# Patient Record
Sex: FEMALE | Race: WHITE | Employment: UNEMPLOYED | URBAN - METROPOLITAN AREA
[De-identification: names, ages, dates, MRNs, and addresses within clinical notes are randomized per-mention and may not be internally consistent; named-entity substitution may affect disease eponyms.]

---

## 2017-02-22 PROBLEM — Z91.89 HISTORY OF DRUG OVERDOSE: Status: ACTIVE | Noted: 2017-02-22

## 2017-02-24 PROBLEM — R76.8 HEPATITIS C ANTIBODY TEST POSITIVE: Status: ACTIVE | Noted: 2017-02-24

## 2017-03-02 PROBLEM — G47.00 INSOMNIA: Status: ACTIVE | Noted: 2017-03-02

## 2017-03-02 PROBLEM — K59.00 CONSTIPATION: Status: ACTIVE | Noted: 2017-03-02

## 2017-03-02 PROBLEM — B37.2 MONILIAL RASH: Status: ACTIVE | Noted: 2017-03-02

## 2017-03-02 PROBLEM — Z91.89 HISTORY OF DRUG OVERDOSE: Status: RESOLVED | Noted: 2017-02-22 | Resolved: 2017-03-02

## 2017-03-09 PROBLEM — F20.9 SCHIZOPHRENIA (HCC): Status: ACTIVE | Noted: 2017-03-09

## 2017-05-17 ENCOUNTER — HOSPITAL ENCOUNTER (OUTPATIENT)
Dept: GENERAL RADIOLOGY | Age: 62
Discharge: HOME | End: 2017-05-17
Payer: COMMERCIAL

## 2017-05-17 DIAGNOSIS — M25.551 RIGHT HIP PAIN: ICD-10-CM

## 2017-05-17 DIAGNOSIS — M25.552 LEFT HIP PAIN: ICD-10-CM

## 2017-05-17 DIAGNOSIS — M25.562 LEFT KNEE PAIN: ICD-10-CM

## 2017-05-17 DIAGNOSIS — M25.561 RIGHT KNEE PAIN: ICD-10-CM

## 2017-05-17 DIAGNOSIS — M54.50 LUMBAR BACK PAIN: ICD-10-CM

## 2017-05-17 PROCEDURE — 73560 X-RAY EXAM OF KNEE 1 OR 2: CPT

## 2017-05-17 PROCEDURE — 73502 X-RAY EXAM HIP UNI 2-3 VIEWS: CPT

## 2017-05-17 PROCEDURE — 72110 X-RAY EXAM L-2 SPINE 4/>VWS: CPT

## 2017-07-13 PROBLEM — F20.9 SCHIZOPHRENIA (HCC): Status: RESOLVED | Noted: 2017-03-09 | Resolved: 2017-07-13

## 2017-07-13 PROBLEM — M79.7 FIBROMYALGIA: Status: ACTIVE | Noted: 2017-07-13

## 2017-07-13 PROBLEM — B37.2 MONILIAL RASH: Status: RESOLVED | Noted: 2017-03-02 | Resolved: 2017-07-13

## 2017-11-17 ENCOUNTER — HOSPITAL ENCOUNTER (OUTPATIENT)
Dept: GENERAL RADIOLOGY | Age: 62
Discharge: HOME | End: 2017-11-17
Payer: COMMERCIAL

## 2017-11-17 DIAGNOSIS — M25.541 PAIN IN JOINTS OF RIGHT HAND: ICD-10-CM

## 2017-11-17 DIAGNOSIS — M25.542 PAIN IN JOINTS OF LEFT HAND: ICD-10-CM

## 2017-11-17 PROCEDURE — 73130 X-RAY EXAM OF HAND: CPT

## 2017-11-20 PROBLEM — E11.65 TYPE 2 DIABETES MELLITUS WITH HYPERGLYCEMIA (HCC): Status: ACTIVE | Noted: 2017-11-20

## 2017-11-20 PROBLEM — R07.9 CHEST PAIN: Status: ACTIVE | Noted: 2017-11-20

## 2018-02-22 PROBLEM — M13.0 POLYARTHRITIS: Status: ACTIVE | Noted: 2018-02-22

## 2018-02-22 PROBLEM — R35.0 URINARY FREQUENCY: Status: ACTIVE | Noted: 2018-02-22

## 2018-02-22 PROBLEM — Z78.9 STATIN INTOLERANCE: Status: ACTIVE | Noted: 2018-02-22

## 2018-04-19 ENCOUNTER — APPOINTMENT (OUTPATIENT)
Dept: GENERAL RADIOLOGY | Age: 63
End: 2018-04-19
Attending: EMERGENCY MEDICINE
Payer: COMMERCIAL

## 2018-04-19 ENCOUNTER — HOSPITAL ENCOUNTER (EMERGENCY)
Age: 63
Discharge: HOME | End: 2018-04-19
Attending: EMERGENCY MEDICINE
Payer: COMMERCIAL

## 2018-04-19 VITALS
OXYGEN SATURATION: 96 % | WEIGHT: 220 LBS | HEART RATE: 91 BPM | HEIGHT: 64 IN | RESPIRATION RATE: 28 BRPM | DIASTOLIC BLOOD PRESSURE: 84 MMHG | BODY MASS INDEX: 37.56 KG/M2 | TEMPERATURE: 97.7 F | SYSTOLIC BLOOD PRESSURE: 167 MMHG

## 2018-04-19 DIAGNOSIS — J20.9 ACUTE BRONCHITIS, UNSPECIFIED ORGANISM: Primary | ICD-10-CM

## 2018-04-19 DIAGNOSIS — R07.9 CHEST PAIN, UNSPECIFIED TYPE: ICD-10-CM

## 2018-04-19 LAB
ALBUMIN SERPL-MCNC: 3 G/DL (ref 3.2–4.6)
ALBUMIN/GLOB SERPL: 0.8 {RATIO} (ref 1.2–3.5)
ALP SERPL-CCNC: 110 U/L (ref 50–136)
ALT SERPL-CCNC: 12 U/L (ref 12–65)
ANION GAP SERPL CALC-SCNC: 2 MMOL/L (ref 7–16)
AST SERPL-CCNC: 13 U/L (ref 15–37)
ATRIAL RATE: 90 BPM
BASOPHILS # BLD: 0 K/UL (ref 0–0.2)
BASOPHILS NFR BLD: 0 % (ref 0–2)
BILIRUB SERPL-MCNC: 0.3 MG/DL (ref 0.2–1.1)
BNP SERPL-MCNC: 15 PG/ML
BUN SERPL-MCNC: 8 MG/DL (ref 8–23)
CALCIUM SERPL-MCNC: 8.9 MG/DL (ref 8.3–10.4)
CALCULATED P AXIS, ECG09: 78 DEGREES
CALCULATED R AXIS, ECG10: 82 DEGREES
CALCULATED T AXIS, ECG11: 87 DEGREES
CHLORIDE SERPL-SCNC: 94 MMOL/L (ref 98–107)
CO2 SERPL-SCNC: 41 MMOL/L (ref 21–32)
CREAT SERPL-MCNC: 0.45 MG/DL (ref 0.6–1)
DIAGNOSIS, 93000: NORMAL
DIFFERENTIAL METHOD BLD: NORMAL
EOSINOPHIL # BLD: 0.2 K/UL (ref 0–0.8)
EOSINOPHIL NFR BLD: 2 % (ref 0.5–7.8)
ERYTHROCYTE [DISTWIDTH] IN BLOOD BY AUTOMATED COUNT: 12.9 % (ref 11.9–14.6)
GLOBULIN SER CALC-MCNC: 3.9 G/DL (ref 2.3–3.5)
GLUCOSE SERPL-MCNC: 360 MG/DL (ref 65–100)
HCT VFR BLD AUTO: 44.9 % (ref 35.8–46.3)
HGB BLD-MCNC: 14.1 G/DL (ref 11.7–15.4)
IMM GRANULOCYTES # BLD: 0 K/UL (ref 0–0.5)
IMM GRANULOCYTES NFR BLD AUTO: 0 % (ref 0–5)
LYMPHOCYTES # BLD: 2.6 K/UL (ref 0.5–4.6)
LYMPHOCYTES NFR BLD: 32 % (ref 13–44)
MCH RBC QN AUTO: 28.5 PG (ref 26.1–32.9)
MCHC RBC AUTO-ENTMCNC: 31.4 G/DL (ref 31.4–35)
MCV RBC AUTO: 90.7 FL (ref 79.6–97.8)
MONOCYTES # BLD: 0.6 K/UL (ref 0.1–1.3)
MONOCYTES NFR BLD: 7 % (ref 4–12)
NEUTS SEG # BLD: 4.7 K/UL (ref 1.7–8.2)
NEUTS SEG NFR BLD: 59 % (ref 43–78)
P-R INTERVAL, ECG05: 176 MS
PLATELET # BLD AUTO: 184 K/UL (ref 150–450)
PMV BLD AUTO: 11.3 FL (ref 10.8–14.1)
POTASSIUM SERPL-SCNC: 3.8 MMOL/L (ref 3.5–5.1)
PROT SERPL-MCNC: 6.9 G/DL (ref 6.3–8.2)
Q-T INTERVAL, ECG07: 362 MS
QRS DURATION, ECG06: 94 MS
QTC CALCULATION (BEZET), ECG08: 442 MS
RBC # BLD AUTO: 4.95 M/UL (ref 4.05–5.25)
SODIUM SERPL-SCNC: 137 MMOL/L (ref 136–145)
TROPONIN I BLD-MCNC: 0 NG/ML (ref 0.02–0.05)
VENTRICULAR RATE, ECG03: 90 BPM
WBC # BLD AUTO: 8 K/UL (ref 4.3–11.1)

## 2018-04-19 PROCEDURE — 83880 ASSAY OF NATRIURETIC PEPTIDE: CPT | Performed by: EMERGENCY MEDICINE

## 2018-04-19 PROCEDURE — 99284 EMERGENCY DEPT VISIT MOD MDM: CPT | Performed by: EMERGENCY MEDICINE

## 2018-04-19 PROCEDURE — 71045 X-RAY EXAM CHEST 1 VIEW: CPT

## 2018-04-19 PROCEDURE — 80053 COMPREHEN METABOLIC PANEL: CPT | Performed by: EMERGENCY MEDICINE

## 2018-04-19 PROCEDURE — 85025 COMPLETE CBC W/AUTO DIFF WBC: CPT | Performed by: EMERGENCY MEDICINE

## 2018-04-19 PROCEDURE — 84484 ASSAY OF TROPONIN QUANT: CPT

## 2018-04-19 PROCEDURE — 93005 ELECTROCARDIOGRAM TRACING: CPT | Performed by: EMERGENCY MEDICINE

## 2018-04-19 RX ORDER — SODIUM CHLORIDE 0.9 % (FLUSH) 0.9 %
5-10 SYRINGE (ML) INJECTION EVERY 8 HOURS
Status: DISCONTINUED | OUTPATIENT
Start: 2018-04-19 | End: 2018-04-20 | Stop reason: HOSPADM

## 2018-04-19 RX ORDER — SODIUM CHLORIDE 0.9 % (FLUSH) 0.9 %
5-10 SYRINGE (ML) INJECTION AS NEEDED
Status: DISCONTINUED | OUTPATIENT
Start: 2018-04-19 | End: 2018-04-20 | Stop reason: HOSPADM

## 2018-04-19 RX ORDER — AZITHROMYCIN 250 MG/1
TABLET, FILM COATED ORAL
Qty: 6 TAB | Refills: 0 | Status: SHIPPED | OUTPATIENT
Start: 2018-04-19 | End: 2018-05-30

## 2018-04-20 NOTE — ED NOTES
Agree with triage notes, IV started, blood work completed, no acute distress noted, daughter at bedside, will continue to monitor for changes

## 2018-04-20 NOTE — ED PROVIDER NOTES
HPI Comments: Patient with chest pain starting yesterday morning along with shortness of breath and cough. Has a chronic cough with her COPD on 4 L of oxygen. No increase in shortness of breath. States chest pain waxes and wanes but is always present since yesterday morning. Has had it for much longer than yesterday morning but worse since yesterday morning. Located on the left side. Sharp in nature. No nausea numbness or diaphoresis. Patient is a 60 y.o. female presenting with chest pain. The history is provided by the patient. No  was used. Chest Pain (Angina)    This is a new problem. The current episode started yesterday. The problem has not changed since onset. The problem occurs constantly. The pain is associated with normal activity. The pain is present in the left side. The pain is moderate. The quality of the pain is described as sharp. The pain does not radiate. Associated symptoms include cough, lower extremity edema (mild bilaterally), malaise/fatigue and shortness of breath (at her baseline on 4 liters for COPD. ). Pertinent negatives include no abdominal pain, no back pain, no diaphoresis, no dizziness, no exertional chest pressure, no fever, no headaches, no irregular heartbeat, no leg pain, no nausea, no numbness, no palpitations, no vomiting and no weakness. She has tried nothing for the symptoms. Risk factors include hypertension, cardiac disease and diabetes mellitus. Her past medical history is significant for DM and HTN.        Past Medical History:   Diagnosis Date    Arthritis     osteoarthritis-knees    CAD (coronary artery disease)     \"they said I had a heart attack\"-had cath 2/07\"no reason for stents\"-no angina since    Chronic pain     general    Chronic respiratory failure ([facility] [address] 7/1/2015    HLD (hyperlipidemia) 7/1/2015    Hypertension     Morbid obesity (HCC)     BMI-45.8 on 11/2/11    Neurogenic bladder, NOS 7/1/2015    Osteoporosis 7/1/2015 Past Surgical History:   Procedure Laterality Date    ABDOMEN SURGERY PROC UNLISTED      ernesto    BREAST SURGERY PROCEDURE UNLISTED      biopsy; benign    HX CHOLECYSTECTOMY  2007    Dr. Marisela Hussein HX HEENT      T&A; sinus; teeth cleaned    HX OTHER SURGICAL      exc ganglion cyst L wrist 11/10    HX UROLOGICAL      bladder - as a child         Family History:   Problem Relation Age of Onset    Stroke Father     Diabetes Brother     Kidney Disease Brother      renal failure    Heart Attack Brother        Social History     Social History    Marital status:      Spouse name: N/A    Number of children: N/A    Years of education: N/A     Occupational History    Not on file. Social History Main Topics    Smoking status: Current Some Day Smoker     Packs/day: 1.00     Years: 50.00    Smokeless tobacco: Never Used    Alcohol use No    Drug use: Yes     Special: Prescription, Marijuana    Sexual activity: Not on file     Other Topics Concern    Exercise No     Social History Narrative     lives with her  with help from her grand daughter         ALLERGIES: Sulfa (sulfonamide antibiotics); Aspirin; Erythrocin [erythromycin lactobionate]; Keflex [cephalexin]; Lyrica [pregabalin]; Penicillins; and Reglan [metoclopramide]    Review of Systems   Constitutional: Positive for fatigue and malaise/fatigue. Negative for chills, diaphoresis and fever. HENT: Positive for congestion. Negative for rhinorrhea and sore throat. Eyes: Negative for pain and redness. Respiratory: Positive for cough and shortness of breath (at her baseline on 4 liters for COPD. ). Negative for chest tightness and wheezing. Cardiovascular: Positive for chest pain and leg swelling. Negative for palpitations. Gastrointestinal: Negative for abdominal pain, diarrhea, nausea and vomiting. Genitourinary: Negative for dysuria and hematuria.    Musculoskeletal: Negative for back pain, gait problem, neck pain and neck stiffness. Skin: Negative for color change and rash. Neurological: Negative for dizziness, weakness, numbness and headaches. Vitals:    04/19/18 2002   BP: 127/77   Pulse: 92   Resp: 18   Temp: 97.7 °F (36.5 °C)   SpO2: 94%   Weight: 99.8 kg (220 lb)   Height: 5' 4\" (1.626 m)            Physical Exam   Constitutional: She is oriented to person, place, and time. She appears well-developed and well-nourished. HENT:   Head: Normocephalic and atraumatic. Neck: Normal range of motion. Neck supple. Cardiovascular: Normal rate and regular rhythm. No murmur heard. Pulmonary/Chest: Breath sounds normal. She is in respiratory distress (increased effort). She has no wheezes. Abdominal: Soft. Bowel sounds are normal. There is no tenderness. Musculoskeletal: Normal range of motion. She exhibits no edema. Neurological: She is alert and oriented to person, place, and time. Skin: Skin is warm and dry. No erythema. Nursing note and vitals reviewed. MDM  Number of Diagnoses or Management Options  Diagnosis management comments: Chest pain feeling better. We'll treat his bronchitis and discharged. Amount and/or Complexity of Data Reviewed  Clinical lab tests: ordered and reviewed  Tests in the radiology section of CPT®: ordered and reviewed  Tests in the medicine section of CPT®: ordered and reviewed    Patient Progress  Patient progress: stable        ED Course       Procedures      EKG: normal sinus rhythm, nonspecific ST and T waves changes. Rate 90. XR CHEST PORT (Final result) Result time: 04/19/18 20:30:26     Final result by Carson Trivedi MD (04/19/18 20:30:26)     Impression:     IMPRESSION: No consolidation.     Narrative:     AP PORTABLE CHEST X-RAY    HISTORY: Chest pain and shortness of breath for several days    COMPARISON: June 9, 2016    FINDINGS: EKG leads are present. There are old left-sided rib fracture  deformities. There is no lobar consolidation or large pleural collections.           Results Include:    Recent Results (from the past 24 hour(s))   EKG, 12 LEAD, INITIAL    Collection Time: 04/19/18  8:06 PM   Result Value Ref Range    Ventricular Rate 90 BPM    Atrial Rate 90 BPM    P-R Interval 176 ms    QRS Duration 94 ms    Q-T Interval 362 ms    QTC Calculation (Bezet) 442 ms    Calculated P Axis 78 degrees    Calculated R Axis 82 degrees    Calculated T Axis 87 degrees    Diagnosis       !! AGE AND GENDER SPECIFIC ECG ANALYSIS !! Normal sinus rhythm  Anteroseptal infarct , age undetermined  Abnormal ECG  When compared with ECG of 22-MAR-2006 12:50,  Anteroseptal infarct is now Present  Confirmed by LYSSA ORDAZ (), [facility] ([id number]) on 4/19/2018 8:49:08 PM     CBC WITH AUTOMATED DIFF    Collection Time: 04/19/18  8:16 PM   Result Value Ref Range    WBC 8.0 4.3 - 11.1 K/uL    RBC 4.95 4.05 - 5.25 M/uL    HGB 14.1 11.7 - 15.4 g/dL    HCT 44.9 35.8 - 46.3 %    MCV 90.7 79.6 - 97.8 FL    MCH 28.5 26.1 - 32.9 PG    MCHC 31.4 31.4 - 35.0 g/dL    RDW 12.9 11.9 - 14.6 %    PLATELET 238 759 - 656 K/uL    MPV 11.3 10.8 - 14.1 FL    DF AUTOMATED      NEUTROPHILS 59 43 - 78 %    LYMPHOCYTES 32 13 - 44 %    MONOCYTES 7 4.0 - 12.0 %    EOSINOPHILS 2 0.5 - 7.8 %    BASOPHILS 0 0.0 - 2.0 %    IMMATURE GRANULOCYTES 0 0.0 - 5.0 %    ABS. NEUTROPHILS 4.7 1.7 - 8.2 K/UL    ABS. LYMPHOCYTES 2.6 0.5 - 4.6 K/UL    ABS. MONOCYTES 0.6 0.1 - 1.3 K/UL    ABS. EOSINOPHILS 0.2 0.0 - 0.8 K/UL    ABS. BASOPHILS 0.0 0.0 - 0.2 K/UL    ABS. IMM.  GRANS. 0.0 0.0 - 0.5 K/UL   METABOLIC PANEL, COMPREHENSIVE    Collection Time: 04/19/18  8:16 PM   Result Value Ref Range    Sodium 137 136 - 145 mmol/L    Potassium 3.8 3.5 - 5.1 mmol/L    Chloride 94 (L) 98 - 107 mmol/L    CO2 41 (HH) 21 - 32 mmol/L    Anion gap 2 (L) 7 - 16 mmol/L    Glucose 360 (H) 65 - 100 mg/dL    BUN 8 8 - 23 MG/DL    Creatinine 0.45 (L) 0.6 - 1.0 MG/DL    GFR est AA >60 >60 ml/min/1.73m2 GFR est non-AA >60 >60 ml/min/1.73m2    Calcium 8.9 8.3 - 10.4 MG/DL    Bilirubin, total 0.3 0.2 - 1.1 MG/DL    ALT (SGPT) 12 12 - 65 U/L    AST (SGOT) 13 (L) 15 - 37 U/L    Alk.  phosphatase 110 50 - 136 U/L    Protein, total 6.9 6.3 - 8.2 g/dL    Albumin 3.0 (L) 3.2 - 4.6 g/dL    Globulin 3.9 (H) 2.3 - 3.5 g/dL    A-G Ratio 0.8 (L) 1.2 - 3.5     BNP    Collection Time: 04/19/18  8:16 PM   Result Value Ref Range    BNP 15 pg/mL   POC TROPONIN-I    Collection Time: 04/19/18  8:23 PM   Result Value Ref Range    Troponin-I (POC) 0 (L) 0.02 - 0.05 ng/ml

## 2018-04-20 NOTE — DISCHARGE INSTRUCTIONS
Bronchitis: Care Instructions  Your Care Instructions    Bronchitis is inflammation of the bronchial tubes, which carry air to the lungs. The tubes swell and produce mucus, or phlegm. The mucus and inflamed bronchial tubes make you cough. You may have trouble breathing. Most cases of bronchitis are caused by viruses like those that cause colds. Antibiotics usually do not help and they may be harmful. Bronchitis usually develops rapidly and lasts about 2 to 3 weeks in otherwise healthy people. Follow-up care is a key part of your treatment and safety. Be sure to make and go to all appointments, and call your doctor if you are having problems. It's also a good idea to know your test results and keep a list of the medicines you take. How can you care for yourself at home? · Take all medicines exactly as prescribed. Call your doctor if you think you are having a problem with your medicine. · Get some extra rest.  · Take an over-the-counter pain medicine, such as acetaminophen (Tylenol), ibuprofen (Advil, Motrin), or naproxen (Aleve) to reduce fever and relieve body aches. Read and follow all instructions on the label. · Do not take two or more pain medicines at the same time unless the doctor told you to. Many pain medicines have acetaminophen, which is Tylenol. Too much acetaminophen (Tylenol) can be harmful. · Take an over-the-counter cough medicine that contains dextromethorphan to help quiet a dry, hacking cough so that you can sleep. Avoid cough medicines that have more than one active ingredient. Read and follow all instructions on the label. · Breathe moist air from a humidifier, hot shower, or sink filled with hot water. The heat and moisture will thin mucus so you can cough it out. · Do not smoke. Smoking can make bronchitis worse. If you need help quitting, talk to your doctor about stop-smoking programs and medicines. These can increase your chances of quitting for good.   When should you call for help? Call 911 anytime you think you may need emergency care. For example, call if:  ? · You have severe trouble breathing. ?Call your doctor now or seek immediate medical care if:  ? · You have new or worse trouble breathing. ? · You cough up dark brown or bloody mucus (sputum). ? · You have a new or higher fever. ? · You have a new rash. ? Watch closely for changes in your health, and be sure to contact your doctor if:  ? · You cough more deeply or more often, especially if you notice more mucus or a change in the color of your mucus. ? · You are not getting better as expected. Where can you learn more? Go to http://rikki-doreen.info/. Enter H333 in the search box to learn more about \"Bronchitis: Care Instructions. \"  Current as of: May 12, 2017  Content Version: 11.4  © 8102-3425 [email or web address]. Care instructions adapted under license by [employer] (which disclaims liability or warranty for this information). If you have questions about a medical condition or this instruction, always ask your healthcare professional. [address] any warranty or liability for your use of this information.

## 2018-04-20 NOTE — ED TRIAGE NOTES
Pt presents to the ED with chest pain which started yesterday. Denies N/V.   Hx of COPD with 4 L continuous

## 2018-04-20 NOTE — ED NOTES
I have reviewed discharge instructions with the patient. The patient verbalized understanding. Patient left ED via Discharge Method: wheelchair to Home with ( and daughter). Opportunity for questions and clarification provided. Patient given 1 scripts. Zithromax. To continue your aftercare when you leave the hospital, you may receive an automated call from our care team to check in on how you are doing. This is a free service and part of our promise to provide the best care and service to meet your aftercare needs.  If you have questions, or wish to unsubscribe from this service please call [phone]. Thank you for Choosing our [facility] Emergency Department.

## 2018-09-26 PROBLEM — Z00.00 ROUTINE GENERAL MEDICAL EXAMINATION AT A HEALTH CARE FACILITY: Status: ACTIVE | Noted: 2018-09-26

## 2019-09-18 ENCOUNTER — PATIENT OUTREACH (OUTPATIENT)
Dept: CASE MANAGEMENT | Age: 64
End: 2019-09-18

## 2019-09-18 NOTE — PROGRESS NOTES
CCM outreach to patient to discuss CCM services. Unable to reach patient. Message left with contact information requesting a return call. Will continue to outreach per protocol. This note will not be viewable in [address]

## 2019-09-19 ENCOUNTER — PATIENT OUTREACH (OUTPATIENT)
Dept: CASE MANAGEMENT | Age: 64
End: 2019-09-19

## 2019-09-23 PROBLEM — Z00.00 ROUTINE GENERAL MEDICAL EXAMINATION AT A HEALTH CARE FACILITY: Status: RESOLVED | Noted: 2018-09-26 | Resolved: 2019-09-23

## 2019-09-24 ENCOUNTER — PATIENT OUTREACH (OUTPATIENT)
Dept: CASE MANAGEMENT | Age: 64
End: 2019-09-24

## 2019-09-24 NOTE — PROGRESS NOTES
CCM outreach to patient to discuss CCM services. Patient declines CCM services at current time. CCM provided contact information to patient for future issues/concerns. This note will not be viewable in [address]